# Patient Record
Sex: MALE | Race: WHITE | NOT HISPANIC OR LATINO | Employment: FULL TIME | URBAN - METROPOLITAN AREA
[De-identification: names, ages, dates, MRNs, and addresses within clinical notes are randomized per-mention and may not be internally consistent; named-entity substitution may affect disease eponyms.]

---

## 2024-01-02 PROBLEM — Z85.038 HISTORY OF COLON CANCER: Status: ACTIVE | Noted: 2024-01-02

## 2024-01-02 PROBLEM — R13.14 PHARYNGOESOPHAGEAL DYSPHAGIA: Status: ACTIVE | Noted: 2024-01-02

## 2024-01-02 PROBLEM — C76.0 HEAD AND NECK CANCER (MULTI): Status: ACTIVE | Noted: 2024-01-02

## 2024-01-02 RX ORDER — BUSPIRONE HYDROCHLORIDE 7.5 MG/1
TABLET ORAL
COMMUNITY
Start: 2016-04-15 | End: 2024-01-10 | Stop reason: ALTCHOICE

## 2024-01-02 RX ORDER — FLUTICASONE PROPIONATE 50 MCG
SPRAY, SUSPENSION (ML) NASAL
COMMUNITY
Start: 2017-01-27

## 2024-01-02 RX ORDER — ALBUTEROL SULFATE 90 UG/1
AEROSOL, METERED RESPIRATORY (INHALATION)
COMMUNITY
Start: 2016-04-15

## 2024-01-02 RX ORDER — ESCITALOPRAM OXALATE 20 MG/1
TABLET ORAL
COMMUNITY
Start: 2017-01-27

## 2024-01-07 NOTE — PROGRESS NOTES
Subjective   Patient ID: Jacky Heath is a 63 y.o. male who presents for No chief complaint on file..  HPI  This 63-year-old is being seen back in the office for recheck of his head neck.  He was last seen in February 2017.  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  He was involved with therapy before being seen by ENT.  Primary site was unknown.  He also has a history of colon cancer.  He also has a history of nasal polyps and he had been on antihistamine therapy along with topical nasal steroid sprays.  Review of Systems  A 12 point ROS has been reviewed and are negative for complaint except what is stated in the history of present illness and/or past medical history as noted in the EMR    Objective   Physical Exam  Examination:    CONSTITUTIONAL: Alert, in no acute distress, normal pitch/clarity of voice, well-developed, well-nourished, cooperative.  HEAD/FACE: Normocephalic, atraumatic, no tenderness over the sinuses, facial strength and movement symmetric.    SKIN: Good turgor, no rashes, no suspicious lesions, in the head and neck.    EYES: Both eyes have normal extraocular movements with no nystagmus, pupils are equal and reactive to light and accommodation, conjunctiva is clear.    EARS: Both ears are negative for external skin abnormalities, external auditory canals are without lesions or signs of inflammation, tympanic membranes are intact and are of normal color and texture, no effusions are seen, light reflexes normal, no mastoid tenderness is noted to palpation, objective hearing is intact.    NOSE: No external skin lesions are noted, nares are patent, septum is intact and noninflamed, nasal turbinates are normal in appearance, sinuses are nontender to palpation bilaterally, no internal lesions or polyps are noted, no discharge is noted.    OROPHARYNX/ORAL CAVITY: Mucous membranes of the oropharynx and the oral  cavity proper are without lesions or ulcerations, tongue mobility is normal and no lesions are noted, gingiva and alveolar mucosa is intact without lesions, oral mucosa is moist, muscular movement of the palate and gag reflex are normal.    NASOPHARYNX: Mucous membranes are noninflamed and no secretions or lesions are noted.    LARYNX: No mucosal inflammation or exudates are noted, arytenoids are normal in appearance and mobility, false vocal cords are without lesions as is the remainder of the supraglottic larynx, true vocal folds are mobile without inflammation or obstructions and no masses or lesions are noted in the endolarynx.    NECK: No lymphadenopathy is palpated, neck is supple with full range of motion, thyroid is without swelling or tenderness, trachea is midline, no neck masses are noted.    Lymphatics: No cervical adenopathy or supraclavicular adenopathy noted to palpation.    HEART/VASCULAR: No jugular venous distention is noted, carotid pulsations are intact with a regular rate and rhythm noted,    PULMONARY: Good air movement with normal inspiratory/expiratory effort is noted, no audible wheezing is appreciated.    NEUROLOGIC: Alert and oriented, cranial nerves are grossly intact, gait is normal, sensation in the head and neck is intact,    PSYCH: oriented to person, place and time, normal mood and affect.    EXTREMITIES: No motor dysfunction of the upper and lower extremity is noted.    Patient ID: Jacky Heath is a 63 y.o. male.    Nasal / sinus endoscopy    Date/Time: 1/10/2024 8:00 AM    Performed by: Tim Castro DMD, MD  Authorized by: Tim Castro DMD, MD    Consent:     Consent obtained:  Verbal    Consent given by:  Patient    Risks discussed:  Pain    Alternatives discussed:  No treatment and observation  Procedure details:     Indications: direct visualization of the upper aerodigestive tract and sino-nasal symptoms    Comments:      NASAL ENDOSCOPY    Consent:  Procedure is  discussed including possible risks, benefits or alternative treatments reviewed and verbal consent is obtained.    Indications:  Need to visualize the ostiomeatal complex, posterior nasal cavity and to assess  for points of compression. Need to evaluate for infection, obstruction or nasal pharyngeal abnormalities.    Procedure:  The nasal cavity is topicalized with decongestant spray and 4% lidocaine  for anesthesia. endoscopy is performed with the scope advanced through each nostril in turn to sequentially examine the septum, turbinates, ostiomeatal complex and nasopharynx. laryngeal exam is also done with use of a fiberoptic scope.    Findings: Fiberoptic examination intranasally on the right revealed a very large polyp obstructive to the nasal airway.  There is also smaller polyps in the middle meatal area.  No purulent discharge was noted.  No septal deviation is seen.  There is no irregularity to suspect to suggest papilloma.  On the left there were polyps in the middle meatal area and 1 anterior to the tip of the middle turbinate.  Purulent discharge was noted there was no septal deviation seen.  The nasopharynx is patent with no lesions.  Base of the tongue laryngeal structures were intact with no signs of lesions or postradiation effect.  There is some narrowing at the glottic level due to the Weaker vocal cord movement.  No obstructive are noted.    Post procedure: The patient tolerated the procedure well with no complications experienced.      His audiogram today in both ears revealed evidence for a mild mixed hearing loss up through 3000 Hz then a moderate to moderately severe high-frequency loss is noted.  There is a conductive component mostly in the low tone areas bilaterally.  SRT levels are 30 dB bilaterally discrimination scores 100% on the right 96% on the left at 60 dB.  Tympanograms revealed a normal pattern on the right mild type C on the left  Assessment/Plan   Problem List Items Addressed This  Visit             ICD-10-CM    Head and neck cancer (CMS/[facility]) C76.0    Pharyngoesophageal dysphagia R13.14     Other Visit Diagnoses         Codes    Nasal polyposis    -  Primary J33.9    Relevant Medications    predniSONE (Deltasone) 10 mg tablet (Start on 1/11/2024)    Other Relevant Orders    CT sinuss wo IV contrast volumetric surgical planning    Sensorineural hearing loss (SNHL) of both ears     H90.3    Tinnitus of both ears     H93.13          I discussed the clinical findings with the patient.  He does have obstructive's obstructive polyps in both nasal cavity especially on the right.  He is can be placed on a tapered course of prednisone for shrinkage of the polyps.  A CT scan of the sinuses was obtained to further evaluate their extent.  He will have a choice regarding endoscopic sinus surgery and an appropriate referral to Dr. Jenkins for that can be made.  Nasal saline rinses should be attempted although it may be difficult for him to circulate the saline due to the obstructive polyps.  We talked about the use of Singulair but because of his background of mood disorders it was felt not to be advisable.  Following the CT scan he will be seen in the office for an opinion in regards to surgical care.    The standpoint of his hearing he does have evidence for a mixed hearing loss in both ears.  Discrimination scores are still intact.  Treatment options would include amplification of the hearing which was discussed.  He should be seen yearly for an audiogram sooner if he has any sudden change in hearing.  I made him aware the fact that there was a conductive component that could be influenced by air pressure changes which could also be influenced by his nasal congestion.  Ear pain, drainage, sudden hearing change should be reported to the office.       Tim Castro DMD, MD 01/07/24 3:18 PM

## 2024-01-10 ENCOUNTER — OFFICE VISIT (OUTPATIENT)
Dept: OTOLARYNGOLOGY | Facility: CLINIC | Age: 64
End: 2024-01-10
Payer: MEDICARE

## 2024-01-10 ENCOUNTER — CLINICAL SUPPORT (OUTPATIENT)
Dept: AUDIOLOGY | Facility: CLINIC | Age: 64
End: 2024-01-10
Payer: MEDICARE

## 2024-01-10 VITALS
SYSTOLIC BLOOD PRESSURE: 100 MMHG | WEIGHT: 217 LBS | BODY MASS INDEX: 30.38 KG/M2 | DIASTOLIC BLOOD PRESSURE: 67 MMHG | HEIGHT: 71 IN | HEART RATE: 59 BPM

## 2024-01-10 DIAGNOSIS — H90.6 MIXED CONDUCTIVE AND SENSORINEURAL HEARING LOSS OF BOTH EARS: Primary | ICD-10-CM

## 2024-01-10 DIAGNOSIS — H90.6 MIXED HEARING LOSS, BILATERAL: ICD-10-CM

## 2024-01-10 DIAGNOSIS — J33.9 NASAL POLYPOSIS: Primary | ICD-10-CM

## 2024-01-10 DIAGNOSIS — H93.11 TINNITUS, RIGHT: ICD-10-CM

## 2024-01-10 DIAGNOSIS — H93.13 TINNITUS OF BOTH EARS: ICD-10-CM

## 2024-01-10 DIAGNOSIS — C76.0 HEAD AND NECK CANCER (MULTI): ICD-10-CM

## 2024-01-10 DIAGNOSIS — R13.14 PHARYNGOESOPHAGEAL DYSPHAGIA: ICD-10-CM

## 2024-01-10 DIAGNOSIS — H69.92 EUSTACHIAN TUBE DYSFUNCTION, LEFT: ICD-10-CM

## 2024-01-10 PROCEDURE — 1160F RVW MEDS BY RX/DR IN RCRD: CPT | Performed by: OTOLARYNGOLOGY

## 2024-01-10 PROCEDURE — 31231 NASAL ENDOSCOPY DX: CPT | Performed by: OTOLARYNGOLOGY

## 2024-01-10 PROCEDURE — 1036F TOBACCO NON-USER: CPT | Performed by: OTOLARYNGOLOGY

## 2024-01-10 PROCEDURE — 92557 COMPREHENSIVE HEARING TEST: CPT | Performed by: AUDIOLOGIST

## 2024-01-10 PROCEDURE — 1159F MED LIST DOCD IN RCRD: CPT | Performed by: OTOLARYNGOLOGY

## 2024-01-10 PROCEDURE — 99204 OFFICE O/P NEW MOD 45 MIN: CPT | Performed by: OTOLARYNGOLOGY

## 2024-01-10 PROCEDURE — 92567 TYMPANOMETRY: CPT | Performed by: AUDIOLOGIST

## 2024-01-10 RX ORDER — BISMUTH SUBSALICYLATE 262 MG
1 TABLET,CHEWABLE ORAL DAILY
COMMUNITY

## 2024-01-10 RX ORDER — PREDNISONE 10 MG/1
TABLET ORAL
Qty: 27 TABLET | Refills: 0 | Status: SHIPPED | OUTPATIENT
Start: 2024-01-11 | End: 2024-01-21

## 2024-01-10 RX ORDER — CETIRIZINE HYDROCHLORIDE 10 MG/1
10 TABLET ORAL DAILY
COMMUNITY

## 2024-01-10 RX ORDER — TAMSULOSIN HYDROCHLORIDE 0.4 MG/1
0.4 CAPSULE ORAL DAILY
COMMUNITY
Start: 2024-01-04

## 2024-01-10 RX ORDER — BUSPIRONE HYDROCHLORIDE 15 MG/1
15 TABLET ORAL DAILY
COMMUNITY
Start: 2024-01-04

## 2024-01-10 NOTE — PROGRESS NOTES
COMPREHENSIVE AUDIOMETRIC EVALUATION      Name:  Jacky Heath  :  1960  Age:  63 y.o.  Date of Evaluation:  01/10/24   Referring Provider:  Dr. Castro     History:  Mr. Heath was seen today for an evaluation of hearing.  Patient reported right tinnitus and concerns for hearing sensitivity.  When asked, patient denied otalgia, aural fullness, dizziness, medical history significant for diabetes, and treatment by chemotherapy or radiation.    See audiometric evaluation at end of this report or scanned under media tab    OTOSCOPY:       Right Ear: Clear canal       Left Ear: Clear canal    226 Hz TYMPANOMETRY:       Right Ear: Type A: normal peak pressure, compliance, and ear canal volume, consistent with middle ear function within normal limits       Left Ear: Type C: Negative pressure with normal compliance and ear canal volume, consistent with eustachian tube dysfunction    NOTE: Extremely difficulty time obtaining seal    AUDIOMETRIC EVALUATION (Inserts, checked with phones):       Right Ear: Mild rising to WNL at 1000 Hz sloping to Moderately severe Mixed hearing loss                 Left Ear: Mild sloping to Severe Mixed hearing loss           Test technique:  Standard Audiometry  Reliability:   good    SPEECH RECOGNITION THRESHOLD:       Right Ear:  30 dBHL in fair agreement with PTA       Left Ear:  30 dBHL in good agreement with PTA    WORD RECOGNITION:       Right Ear:  excellent (100%) at elevated presentation level       Left Ear:  excellent (96%) at elevated presentation level    IPSILATERAL ACOUSTIC REFLEXES: Attempted but could not maintain consistent seal    DISCUSSION:   Discussed results and recommendations with patient.  Questions were addressed and the patient was encouraged to contact our department should concerns arise.    RECOMMENDATIONS:  -Recommend patient return following any medical management for repeated audiometric evaluation for evaluation of efficacy of management on hearing  sensitivity and tympanometry  -Recommend patient return should concerns for changes in hearing arise or as medically indicated.    Yahir Ash, CCC-A     Appt: 8:30 - 9:00 AM

## 2024-01-23 ENCOUNTER — HOSPITAL ENCOUNTER (OUTPATIENT)
Dept: RADIOLOGY | Facility: CLINIC | Age: 64
Discharge: HOME | End: 2024-01-23
Payer: MEDICARE

## 2024-01-23 ENCOUNTER — TELEPHONE (OUTPATIENT)
Dept: OTOLARYNGOLOGY | Facility: CLINIC | Age: 64
End: 2024-01-23
Payer: MEDICARE

## 2024-01-23 DIAGNOSIS — J33.9 NASAL POLYPOSIS: ICD-10-CM

## 2024-01-23 PROCEDURE — 70486 CT MAXILLOFACIAL W/O DYE: CPT

## 2024-01-23 NOTE — TELEPHONE ENCOUNTER
----- Message from Tim Castro DMD, MD sent at 1/23/2024 12:48 PM EST -----  Please call--the CT scan of the sinuses revealed significant obstruction within the sinus complexes involving the cheek areas and small sinuses called ethmoid sinuses.  This could relate to the nasal polyps alone or also be related to cystic changes within the sinus cavities or other abnormalities related to mucous membranes.  This area needs to be evaluated further as discussed and you are scheduled in early February for a follow-up here.  In the interim you should try to use saline as a nasal wash along with a topical steroid eel spray.  The CT scan images are available for review with Dr. Jenkins when you see him.

## 2024-01-23 NOTE — RESULT ENCOUNTER NOTE
Please call--the CT scan of the sinuses revealed significant obstruction within the sinus complexes involving the cheek areas and small sinuses called ethmoid sinuses.  This could relate to the nasal polyps alone or also be related to cystic changes within the sinus cavities or other abnormalities related to mucous membranes.  This area needs to be evaluated further as discussed and you are scheduled in early February for a follow-up here.  In the interim you should try to use saline as a nasal wash along with a topical steroid eel spray.  The CT scan images are available for review with Dr. Jenkins when you see him.

## 2024-01-30 ENCOUNTER — APPOINTMENT (OUTPATIENT)
Dept: OTOLARYNGOLOGY | Facility: CLINIC | Age: 64
End: 2024-01-30
Payer: MEDICARE

## 2024-02-06 ENCOUNTER — OFFICE VISIT (OUTPATIENT)
Dept: OTOLARYNGOLOGY | Facility: CLINIC | Age: 64
End: 2024-02-06
Payer: MEDICARE

## 2024-02-06 VITALS — WEIGHT: 215 LBS | HEIGHT: 71 IN | BODY MASS INDEX: 30.1 KG/M2

## 2024-02-06 DIAGNOSIS — J34.89 NASAL DRAINAGE: ICD-10-CM

## 2024-02-06 DIAGNOSIS — J34.89 NASAL OBSTRUCTION: ICD-10-CM

## 2024-02-06 DIAGNOSIS — R09.81 NASAL CONGESTION: ICD-10-CM

## 2024-02-06 DIAGNOSIS — J33.9 NASAL POLYPS: ICD-10-CM

## 2024-02-06 DIAGNOSIS — J34.3 HYPERTROPHY OF BOTH INFERIOR NASAL TURBINATES: ICD-10-CM

## 2024-02-06 DIAGNOSIS — R44.8 FACIAL PRESSURE: ICD-10-CM

## 2024-02-06 DIAGNOSIS — J32.9 CHRONIC RHINOSINUSITIS: Primary | ICD-10-CM

## 2024-02-06 PROCEDURE — 31237 NSL/SINS NDSC SURG BX POLYPC: CPT | Performed by: STUDENT IN AN ORGANIZED HEALTH CARE EDUCATION/TRAINING PROGRAM

## 2024-02-06 PROCEDURE — 88304 TISSUE EXAM BY PATHOLOGIST: CPT

## 2024-02-06 PROCEDURE — 88304 TISSUE EXAM BY PATHOLOGIST: CPT | Performed by: PATHOLOGY

## 2024-02-06 PROCEDURE — 1036F TOBACCO NON-USER: CPT | Performed by: STUDENT IN AN ORGANIZED HEALTH CARE EDUCATION/TRAINING PROGRAM

## 2024-02-06 PROCEDURE — 99215 OFFICE O/P EST HI 40 MIN: CPT | Performed by: STUDENT IN AN ORGANIZED HEALTH CARE EDUCATION/TRAINING PROGRAM

## 2024-02-06 PROCEDURE — 1159F MED LIST DOCD IN RCRD: CPT | Performed by: STUDENT IN AN ORGANIZED HEALTH CARE EDUCATION/TRAINING PROGRAM

## 2024-02-06 PROCEDURE — 1160F RVW MEDS BY RX/DR IN RCRD: CPT | Performed by: STUDENT IN AN ORGANIZED HEALTH CARE EDUCATION/TRAINING PROGRAM

## 2024-02-06 RX ORDER — AMOXICILLIN AND CLAVULANATE POTASSIUM 875; 125 MG/1; MG/1
875 TABLET, FILM COATED ORAL 2 TIMES DAILY
Qty: 20 TABLET | Refills: 0 | Status: SHIPPED | OUTPATIENT
Start: 2024-02-06 | End: 2024-02-16

## 2024-02-06 RX ORDER — SOD CHLOR,BICARB/SQUEEZ BOTTLE
PACKET, WITH RINSE DEVICE NASAL
Qty: 1 EACH | Refills: 3 | Status: SHIPPED | OUTPATIENT
Start: 2024-02-06

## 2024-02-06 RX ORDER — AMOXICILLIN AND CLAVULANATE POTASSIUM 875; 125 MG/1; MG/1
875 TABLET, FILM COATED ORAL 2 TIMES DAILY
Qty: 28 TABLET | Refills: 0 | Status: SHIPPED | OUTPATIENT
Start: 2024-02-06 | End: 2024-02-06 | Stop reason: SDUPTHER

## 2024-02-06 RX ORDER — FERROUS SULFATE 325(65) MG
65 TABLET, DELAYED RELEASE (ENTERIC COATED) ORAL
COMMUNITY

## 2024-02-06 RX ORDER — OMEGA-3S/DHA/EPA/FISH OIL/D3 300MG-1000
CAPSULE ORAL
COMMUNITY

## 2024-02-06 RX ORDER — LANOLIN ALCOHOL/MO/W.PET/CERES
1000 CREAM (GRAM) TOPICAL DAILY
COMMUNITY

## 2024-02-06 RX ORDER — ZINC GLUCONATE 50 MG
50 TABLET ORAL DAILY
COMMUNITY

## 2024-02-06 NOTE — PROGRESS NOTES
HPI  63-year-old male presenting referred by Dr. Castro for evaluation of multiple sinonasal issues.  Main Symptoms: Daily sinonasal symptoms include bilateral nasal congestion/obstruction, anterior and posterior nasal drainage, fluctuant facial pressure along the maxillary and frontal regions bilaterally and absent smell.  Duration: Years  Laterality: Bilateral  Received systemic steroids without significant improvement of his symptoms  Has a history of H&N HPV cancer (unknown primary site) treated with chemoradiation therapy  >9-10y ago ([facility]).  Also has a history of colon cancer.       Patient denies facial pain, facial numbness, immunotherapy.    No odynophagia/dysphagia/SOB/dyspnea/hoarseness/fevers/chills/weight loss/night sweats.    Current treatment:  Nasal Steroid: Flonase   Sinus Rinse: No  Antihistamine: No  Prednisone: No  Other: None    Recent CT: CT sinus 1/10/2024 reviewed notable for polypoid changes within the nasal corridors and middle meatus bilaterally more noticeable on the right with opacification of bilateral maxillary sinuses, right frontal sinus, right ethmoid sinus with bone thinning/remodeling on the right fovea ethmoidalis, mild mucosal edema noted along the left ethmoid region, mild mucosal edema noted along the right sphenoid sinus.    Previous nasal/sinus surgery: Remote history of septoplasty.    Past Medical History:   Diagnosis Date    Acute respiratory distress     Respiratory difficulty    Personal history of other endocrine, nutritional and metabolic disease     H/O diabetes mellitus        Past Surgical History:   Procedure Laterality Date    GASTRIC BYPASS  04/15/2016    Gastric Surgery For Morbid Obesity Bypass With Eamon-en-Y        Current Outpatient Medications   Medication Instructions    albuterol (ProAir HFA) 90 mcg/actuation inhaler inhalation    busPIRone (BUSPAR) 15 mg, oral, Daily    cetirizine (ZYRTEC) 10 mg, oral, Daily    cyanocobalamin (VITAMIN B-12) 1,000 mcg,  oral, Daily    escitalopram (Lexapro) 20 mg tablet oral    ferrous sulfate 65 mg, oral, 3 times daily with meals, Do not crush, chew, or split.    fluticasone (Flonase) 50 mcg/actuation nasal spray nasal    multivitamin tablet 1 tablet, oral, Daily    omega-3s-dha-epa-fish oil (Fish OiL) 120 mg-180 mg- 60 mg-1,200 mg capsule oral    tamsulosin (FLOMAX) 0.4 mg, oral, Daily    zinc gluconate 50 mg tablet 50 mg of elemental zinc, oral, Daily        Allergies   Allergen Reactions    Lisinopril Other        Review of Systems  A detailed 12 point ROS was performed and is negative except as noted in the intake form, HPI and/or Past Medical History    Physical Exam   CONSTITUTIONAL: Well developed, well-nourished  VOICE: Normal voice quality  RESPIRATION: Breathing comfortably, no stridor.  CV: No clubbing/cyanosis/edema in hands.  EYES: EOM Intact, sclera normal.  NEURO: Alert and oriented times 3, Cranial nerves V,VII intact and symmetric bilaterally.  HEAD AND FACE: Symmetric facial features, no masses or lesions, sinuses nontender to palpation.  SALIVARY GLANDS: Parotid and submandibular glands normal bilaterally.  EARS: Normal external ears, external auditory canals, and TMs to otoscopy  + NOSE: External nose midline, anterior rhinoscopy is normal with limited visualization to the anterior aspect of the interior turbinates. No lesions noted.  Bilateral inferior turbinate hypertrophy  Visualized septum mostly ML  ORAL CAVITY/OROPHARYNX/LIPS: Normal mucous membranes, normal floor of mouth/tongue/OP, no masses or lesions are noted.  PHARYNGEAL WALLS AND NASOPHARYNX: No masses noted. Mucosa appears clean and moist  NECK/LYMPH: No LAD, no thyroid masses. Trachea palpably midline  SKIN: Neck skin is without injury s/p RT  PSYCH: Alert and oriented with appropriate mood and affect     Nasal endoscopy with biopsy:  PROCEDURE NOTE    For better visualization because of septal deviation, turbinate hypertrophy nasal endoscopy was  performed after verbal consent was obtained by the patient and/or guardian. Both nostrils were sprayed with a mixture of lidocaine 4% and Afrin. After a sufficient amount of time elapsed for mucosal anesthesia to take place, the nasal endoscope was advanced into the nostril.    The following areas were visualized:  Nasal passage, nasal septum, turbinates, middle meatus, nasopharynx, sinus ostia    The patient tolerated the procedure well and these structures were found to be normal except as follows:  Bilateral inferior turbinate hypertrophy  Visualized septum ML  Right: Generalized mucosal edema, polypoid tissue obstructing the entire nasal corridor, slight irregularity noted (biopsy obtained).  Left: Generalized mucosal edema, polyps obstructing the middle meatus and the nasal corridor.  Mucopurulent drainage noted along the left middle meatus  Biopsy of right nasal corridor   The procedure was reviewed and explained, consent was obtained.  The area was anesthetized with lidocaine.  A cup forceps was used to grasp polypoid tissue obstructing the right nasal corridor.  Specimen was sent in formalin.  Hemostasis was achieved.  Surgifoam packing placed.  The patient tolerated the procedure well.  No complications were encountered.        Assessment  Chronic rhinosinusitis with nasal polyps  Facial pressure  Nasal airway obstruction  Nasal septal deviation  Nasal drainage  Bilateral inferior turbinate hypertrophy  History of septoplasty  History of head and neck cancer, unknown primary s/p chemoradiation therapy 2014.       Plan  The patient and I discussed their current nasal / sinus symptoms as well as several therapeutic options.  Nasal endoscopy notable for polypoid tissue obstructing the entire right nasal corridor, biopsy obtained.  Polyps obstructing the left middle meatus and partially obstructing the nasal corridor.  Mucopurulent drainage noted in the left middle meatus.  Augmentin course prescribed.  The  patient recently completed a systemic steroid course, we will defer additional steroid at this time.  Continue Flonase and nasal saline.  The patient reports no significant improvement of his symptoms following adequate medical therapy.  Multiple treatment alternatives, risks and benefits discussed.  He would like to proceed with surgery including bilateral functional endoscopic sinus surgery, bilateral inferior turbinate reduction/outfracture possible revision septoplasty.    The patient and I discussed the aspects of the proposed surgery today. We discussed alternative therapies, which include doing nothing and continuing medical therapy. We discussed the fact that surgery offers an opportunity to correct any anatomic deformities and to relieve a problem that has not been improved with medical therapy. The anticipated benefits from endoscopic surgery include but are not limited to: eradication of sinus disease, relief from pain and symptoms. The risks and complications associated with this surgery were discussed. These include, but were not limited to: anesthesia/medication complications (cva, mi, death), heart/lung/brain dysfunction, synechiae formation, poor cosmetic result possibly requiring reconstructive surgery, septal perforation possibly requiring reconstructive surgery, bleeding, infection, postoperative pain, failure to relieve pain and other preoperative symptoms, visual acuity changes (temporary or permanent), loss of smell/taste, csf leak, need for further surgeries in future. Each of these potential complications was discussed in detail.  No guarantees were implied and the possibility of recurrence was discussed and understood. The nature of the procedure and the pre and postoperative processes were discussed as well.     The patient understands, asked appropriate questions, and wishes to proceed with surgery.

## 2024-02-06 NOTE — PATIENT INSTRUCTIONS
What can I expect before and after surgery?     1) Before surgery   In preparation for your surgery, your physician may prescribe a preoperative regimen of medications in order to optimize the condition of your sinuses prior to surgery. The medications may include oral steroids (prednisone) that you start taking 7 days before surgery (don't take it on the day of surgery on) restart on postoperative day 1. Take the antibiotics AFTER the surgery. If any preoperative medications are deemed necessary by your physician, please be sure to start the medications on the day directed and adhere closely to the prescription. In addition, you should avoid taking aspirin and aspirin related products for at least 14 days prior to surgery. Aspirin, ibuprofen (Motrin/Advil), naproxen (Aleve), Excedrin (contains aspirin) and related products can thin the blood and create excessive bleeding both during the surgery and in the postoperative period. Tylenol is acceptable and may be taken any time up to the day of surgery.     2) After surgery   At your first postoperative visit any packs that may have been placed will be removed and your nose will be cleaned of clotted blood and debris. This is an uncomfortable procedure, so we recommend that you take a pain pill approximately one hour prior to your appointment, even if you have not been experiencing much postoperative pain up to that day. You will continue to have additional postoperative visits at an interval determined by your surgeon, until your sinuses have completely healed and are doing well. Sinus surgery does NOT eliminate the underlying causes that gave you sinusitis in the first place, so it is important that you continue your medical therapies (nasal spray, irrigation etc) even after surgery.     How do I prepare for surgery?   If your age and/or medical history indicate it, your physician may order testing prior to surgery such as blood tests, EKG, or chest x-ray. If these  have been ordered, please make sure they are completed before the day of surgery.   If you have a significant increase in your sinus infection in the week(s) prior to surgery, notify us. Your surgery may need to be postponed.   Make sure you have a  to take you home after surgery. You will NOT be allowed to leave the hospital by yourself or to drive yourself home.     DO NOT take aspirin or salicylate containing pain medication for at least ten days prior to surgery. Aspirin, even in small quantities, can significantly increase bleeding during surgery and postoperatively.     DO NOT take non-steroidal anti-inflammatory drugs (Ibuprofen, Advil, Motrin, Aleve, Naproxen) for at least five days prior to surgery. These drugs will also increase bleeding, although the effects on the blood are shorter. You may use Tylenol for pain control during this time if needed. Any other medications which thin the blood (such as Coumadin or Plavix) will need to be stopped prior to surgery. Your surgeon will discuss the timing of when to stop coumadin and whether another short acting medication such as Lovenox will be needed to use in place of the Coumadin while you are off it.     DO NOT smoke if at all possible for at least three weeks prior to surgery. Not only does smoking worsen sinus symptoms, smoking in the weeks before or after surgery will result in excessive scarring, and may result in failure of the operation.     DO NOT eat or drink anything beginning at midnight the night before surgery unless otherwise instructed by our colleagues in anesthesia. In most cases your normal morning medications should be taken with a small sip of water on the morning of surgery - your surgeon will let you know if there any exceptions to this.     NOTIFY YOUR SURGEON if you have a prosthesis or heart valve disorder that requires antibiotics at the time of surgery or if you have had prior difficulty with general anesthesia.     What will  happen during surgery?   The surgery is typically not uncomfortable and should not be an unpleasant experience. Depending on the several factors, the operation may be performed under general anesthesia or under local anesthesia with an anesthesiologist providing monitored sedation.     Regardless of anesthetic method chosen, in the preoperative area an intravenous line will be started to administer fluid and you will be given medicines in your IV to help you relax.   Once in the operating room, if local anesthesia is chosen, you will be given additional IV medication to keep you sleepy and relaxed. Medicated pieces of cotton will be placed in your nose and several injections of local anesthetic will be administered inside your nose. The anesthesiologist will make you extra sleepy during this process, but once the numbing is done, you will be allowed to awaken a little more. You will be able to talk to us during the surgery, so let us know if anything bothers you. You will usually hear some crunching sounds as bone is removed which may sound loud to you. For this reason many patients elect to bring an ipod or other device to the OR with them and listen to music during the procedure. You may also feel some mucus or bleeding in the back of your throat that you should swallow. Should you experience significant discomfort during the procedure, we will provide additional monitored sedation.     If you are receiving general anesthesia, a temporary tube is inserted into you mouth/throat after you are completely asleep to maintain your breathing. If the planned surgery is for more than 3 hours duration, a urinary catheter may also be placed while you are asleep. It is usually removed before you leave the recovery room. It is rare that a catheter is necessary. When the surgery is completed, the breathing tube is removed from your airway before you regain consciousness. Patients occasionally report sore throat and/or nausea  postoperatively as a result of the breathing tube and anesthetic.     What can I expect following my surgery?   Some bloody postnasal discharge may occur for approximately two weeks after this procedure. This is normal and slowly improves. You SHOULD NOT blow your nose for at least seven days following surgery. Since nasal packing is usually not used, you will have some bleeding from your nose and you may go through more than one box of tissues by spotting them with blood during the first 24-48 hours after surgery. As the sinuses begin to clear themselves, you can expect to have some thick brown drainage from your nose. This is mucus and old blood and does not indicate an infection.    You may experience some discomfort postoperatively due to manipulation and inflammation. Take your pain medication as directed. Often extra-strength Tylenol® is sufficient.    On the the first day after surgery you should begin irrigating six times a day with saline using the [facility] Sinus Rinse bottle. If you have had packing placed in your nose, you will not be able to do these things until the packing is removed. Your surgeon will inform you if packing has been placed.   The first follow-up visit is usually arranged at approximately one week after surgery to clean clot and crusts from the nose. This visit is usually uncomfortable and we recommend that you take a dose of your pain medication about one hour prior to the visit. If you are using narcotic pain medicine, you will need a . Consent to the surgery also includes consent to this postoperative care.     Careful postoperative care by both you and your surgeon is essential to the success of the surgery. It is very important that you follow these instructions, as well as any additional instructions given by us, to promote healing and decrease the chance of complications from the surgery.     SOME IMPORTANT POSTOPERATIVE DO´S AND DON´TS AFTER SURGERY     ° DO NOT blow your  nose until you have been given permission to do so (usually one week following surgery).     ° DO NOT bend, lift or strain for at lease one week after surgery. These activities will promote bleeding from your nose. You should not plan on participating in any rigorous activity until healing is completed.     ° DO NOT suppress the need to cough or sneeze, but cough/sneeze with your mouth open.     ° DO NOT resume use of any aspirin-containing products or other blood thinners until after discussing this with us. Typically, you can restart after 7-10 days.     ° DO NOT overuse Tylenol (acetaminophen). Extra-strength Tylenol can be used for pain but can be harmful to your liver if used overdosed. Your prescription narcotic pain medicine also contains Tylenol, so it is important that you don´t use both at the same time. Do not exceed 2000 mg of Tylenol in 24 hours.     ° DO use nasal saline spray (without decongestant) every hour while you are awake beginning the day after surgery. This helps moisten your nose and prevents large crusts from forming.    ° DO use nasal saline irrigation at least six times daily beginning on day after surgery     ° DO take your antibiotics (if they have been prescribed for you). Mild diarrhea from antibiotic usage is common. This can often be prevented by taking acidophilus daily, which is found in yogurt with active cultures or as tablets in a health food store. If you should experience severe persistent diarrhea, this may be indicative of another health problem. In this case stop the antibiotic and notify us. Further evaluation may be required.     ° DO notify us for any of the following: temperature elevations above 100.5 F, clear watery drainage from your nose, changes in vision, swelling of the eyes, worsening headache or neck stiffness.     ° DO use a Qtip to place antibiotic ointment in the very front inside your nostrils as needed to minimize or soften crusting. Neosporin, bacitracin  or double/triple antibiotic ointments are all OK to use, up to twice a day     ° DO resume your normal preoperative medications (except aspirin or other blood thinners as noted above)    For the first week following surgery you should not blow your nose or perform any activities requiring exertion such as bending, straining, exercising or lifting anything heavier than 10 pounds. You should not plan any air travel in the first week after surgery.   Ultimately, it will take about 2-3 months for you to fully recover from surgery

## 2024-02-12 LAB
LABORATORY COMMENT REPORT: NORMAL
PATH REPORT.FINAL DX SPEC: NORMAL
PATH REPORT.GROSS SPEC: NORMAL
PATH REPORT.RELEVANT HX SPEC: NORMAL
PATH REPORT.TOTAL CANCER: NORMAL

## 2024-04-12 ENCOUNTER — OFFICE VISIT (OUTPATIENT)
Dept: OTOLARYNGOLOGY | Facility: CLINIC | Age: 64
End: 2024-04-12
Payer: MEDICARE

## 2024-04-12 VITALS — BODY MASS INDEX: 30.8 KG/M2 | WEIGHT: 220 LBS | HEIGHT: 71 IN

## 2024-04-12 DIAGNOSIS — J34.89 NASAL DRAINAGE: ICD-10-CM

## 2024-04-12 DIAGNOSIS — R44.8 FACIAL PRESSURE: ICD-10-CM

## 2024-04-12 DIAGNOSIS — R09.81 NASAL CONGESTION: ICD-10-CM

## 2024-04-12 DIAGNOSIS — J33.9 NASAL POLYPS: ICD-10-CM

## 2024-04-12 DIAGNOSIS — J34.3 HYPERTROPHY OF BOTH INFERIOR NASAL TURBINATES: ICD-10-CM

## 2024-04-12 DIAGNOSIS — J34.89 NASAL OBSTRUCTION: ICD-10-CM

## 2024-04-12 DIAGNOSIS — J32.9 CHRONIC RHINOSINUSITIS: Primary | ICD-10-CM

## 2024-04-12 DIAGNOSIS — J33.9 NASAL POLYPOSIS: ICD-10-CM

## 2024-04-12 PROCEDURE — 1159F MED LIST DOCD IN RCRD: CPT | Performed by: STUDENT IN AN ORGANIZED HEALTH CARE EDUCATION/TRAINING PROGRAM

## 2024-04-12 PROCEDURE — 1160F RVW MEDS BY RX/DR IN RCRD: CPT | Performed by: STUDENT IN AN ORGANIZED HEALTH CARE EDUCATION/TRAINING PROGRAM

## 2024-04-12 PROCEDURE — 1036F TOBACCO NON-USER: CPT | Performed by: STUDENT IN AN ORGANIZED HEALTH CARE EDUCATION/TRAINING PROGRAM

## 2024-04-12 PROCEDURE — 99214 OFFICE O/P EST MOD 30 MIN: CPT | Performed by: STUDENT IN AN ORGANIZED HEALTH CARE EDUCATION/TRAINING PROGRAM

## 2024-04-12 RX ORDER — PREDNISONE 10 MG/1
TABLET ORAL
Qty: 31 TABLET | Refills: 0 | Status: SHIPPED | OUTPATIENT
Start: 2024-04-12

## 2024-04-12 NOTE — PROGRESS NOTES
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  He is here to discuss his upcoming surgery and clarify questions.  Recall   63-year-old male presenting referred by Dr. Castro for evaluation of multiple sinonasal issues.  Main Symptoms: Daily sinonasal symptoms include bilateral nasal congestion/obstruction, anterior and posterior nasal drainage, fluctuant facial pressure along the maxillary and frontal regions bilaterally and absent smell.  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  Also has a history of colon cancer.       Patient denies facial pain, facial numbness, immunotherapy.    No odynophagia/dysphagia/SOB/dyspnea/hoarseness/fevers/chills/weight loss/night sweats.    Current treatment:  Nasal Steroid: Flonase   Sinus Rinse: No  Antihistamine: No  Prednisone: No  Other: None    Recent CT: CT sinus 1/10/2024 reviewed notable for polypoid changes within the nasal corridors and middle meatus bilaterally more noticeable on the right with opacification of bilateral maxillary sinuses, right frontal sinus, right ethmoid sinus with bone thinning/remodeling on the right fovea ethmoidalis, mild mucosal edema noted along the left ethmoid region, mild mucosal edema noted along the right sphenoid sinus.    Previous nasal/sinus surgery: Remote history of septoplasty.    Past Medical History:   Diagnosis Date    Acute respiratory distress     Respiratory difficulty    Personal history of other endocrine, nutritional and metabolic disease     H/O diabetes mellitus        Past Surgical History:   Procedure Laterality Date    GASTRIC BYPASS  04/15/2016    Gastric Surgery For Morbid Obesity Bypass With [id number]-Y        Current Outpatient Medications   Medication  Instructions    albuterol (ProAir HFA) 90 mcg/actuation inhaler inhalation    busPIRone (BUSPAR) 15 mg, oral, Daily    cetirizine (ZYRTEC) 10 mg, oral, Daily    cyanocobalamin (VITAMIN B-12) 1,000 mcg, oral, Daily    escitalopram (Lexapro) 20 mg tablet oral    ferrous sulfate 65 mg, oral, 3 times daily with meals, Do not crush, chew, or split.    fluticasone (Flonase) 50 mcg/actuation nasal spray nasal    multivitamin tablet 1 tablet, oral, Daily    omega-3s-dha-epa-fish oil (Fish OiL) 120 mg-180 mg- 60 mg-1,200 mg capsule oral    sod chloride-sodium bicarb ([employer] Sinus Rinse Complete) nasal rinse Irrigate your nose per instructions twice daily    tamsulosin (FLOMAX) 0.4 mg, oral, Daily    zinc gluconate 50 mg tablet 50 mg of elemental zinc, oral, Daily        Allergies   Allergen Reactions    Lisinopril Other        Review of Systems  A detailed 12 point ROS was performed and is negative except as noted in the intake form, HPI and/or Past Medical History    Physical Exam   CONSTITUTIONAL: Well developed, well-nourished  VOICE: Normal voice quality  RESPIRATION: Breathing comfortably, no stridor.  CV: No clubbing/cyanosis/edema in hands.  EYES: EOM Intact, sclera normal.  NEURO: Alert and oriented times 3, Cranial nerves V,VII intact and symmetric bilaterally.  HEAD AND FACE: Symmetric facial features, no masses or lesions, sinuses nontender to palpation.  SALIVARY GLANDS: Parotid and submandibular glands normal bilaterally.  EARS: Normal external ears, external auditory canals, and TMs to otoscopy  + NOSE: External nose midline, anterior rhinoscopy is normal with limited visualization to the anterior aspect of the interior turbinates. No lesions noted.  Bilateral inferior turbinate hypertrophy  Visualized septum mostly ML  ORAL CAVITY/OROPHARYNX/LIPS: Normal mucous membranes, normal floor of mouth/tongue/OP, no masses or lesions are noted.  PHARYNGEAL WALLS AND NASOPHARYNX: No masses noted. Mucosa appears clean  and moist  NECK/LYMPH: No LAD, no thyroid masses. Trachea palpably midline  SKIN: Neck skin is without injury s/p RT  PSYCH: Alert and oriented with appropriate mood and affect           Assessment  Chronic rhinosinusitis with nasal polyps  Facial pressure  Nasal airway obstruction  Nasal septal deviation  Nasal drainage  Bilateral inferior turbinate hypertrophy  History of septoplasty  History of head and neck cancer, unknown primary s/p chemoradiation therapy 2014.       Plan  The patient and I discussed their current nasal / sinus symptoms as well as several therapeutic options.  The patient reports no significant improvement of his symptoms following adequate medical therapy.  Multiple treatment alternatives, risks and benefits discussed.  He will like to proceed with surgery including bilateral functional endoscopic sinus surgery, bilateral inferior turbinate reduction/outfracture, possible revision septoplasty.    The patient and I discussed the aspects of the proposed surgery today. We discussed alternative therapies, which include doing nothing and continuing medical therapy. We discussed the fact that surgery offers an opportunity to correct any anatomic deformities and to relieve a problem that has not been improved with medical therapy. The anticipated benefits from endoscopic surgery include but are not limited to: eradication of sinus disease, relief from pain and symptoms. The risks and complications associated with this surgery were discussed. These include, but were not limited to: anesthesia/medication complications (cva, mi, death), heart/lung/brain dysfunction, synechiae formation, poor cosmetic result possibly requiring reconstructive surgery, septal perforation possibly requiring reconstructive surgery, bleeding, infection, postoperative pain, failure to relieve pain and other preoperative symptoms, visual acuity changes (temporary or permanent), loss of smell/taste, csf leak, need for further  surgeries in future. Each of these potential complications was discussed in detail.  No guarantees were implied and the possibility of recurrence was discussed and understood. The nature of the procedure and the pre and postoperative processes were discussed as well.     The patient understands, asked appropriate questions, and wishes to proceed with surgery.     Preoperative steroid taper prescribed (risks/potential adverse effects discussed).

## 2024-04-12 NOTE — PATIENT INSTRUCTIONS
What can I expect before and after surgery?     1) Before surgery   In preparation for your surgery, your physician may prescribe a preoperative regimen of medications in order to optimize the condition of your sinuses prior to surgery. The medications may include oral steroids (prednisone) that you start taking 7 days before surgery (don't take it on the day of surgery on) restart on postoperative day 1. Take the antibiotics AFTER the surgery. If any preoperative medications are deemed necessary by your physician, please be sure to start the medications on the day directed and adhere closely to the prescription. In addition, you should avoid taking aspirin and aspirin related products for at least 14 days prior to surgery. Aspirin, ibuprofen (Motrin/Advil), naproxen (Aleve), Excedrin (contains aspirin) and related products can thin the blood and create excessive bleeding both during the surgery and in the postoperative period. Tylenol is acceptable and may be taken any time up to the day of surgery.     2) After surgery   At your first postoperative visit any packs that may have been placed will be removed and your nose will be cleaned of clotted blood and debris. This is an uncomfortable procedure, so we recommend that you take a pain pill approximately one hour prior to your appointment, even if you have not been experiencing much postoperative pain up to that day. You will continue to have additional postoperative visits at an interval determined by your surgeon, until your sinuses have completely healed and are doing well. Sinus surgery does NOT eliminate the underlying causes that gave you sinusitis in the first place, so it is important that you continue your medical therapies (nasal spray, irrigation etc) even after surgery.     How do I prepare for surgery?   If your age and/or medical history indicate it, your physician may order testing prior to surgery such as blood tests, EKG, or chest x-ray. If these  have been ordered, please make sure they are completed before the day of surgery.   If you have a significant increase in your sinus infection in the week(s) prior to surgery, notify us. Your surgery may need to be postponed.   Make sure you have a  to take you home after surgery. You will NOT be allowed to leave the hospital by yourself or to drive yourself home.     DO NOT take aspirin or salicylate containing pain medication for at least ten days prior to surgery. Aspirin, even in small quantities, can significantly increase bleeding during surgery and postoperatively.     DO NOT take non-steroidal anti-inflammatory drugs (Ibuprofen, Advil, Motrin, Aleve, Naproxen) for at least five days prior to surgery. These drugs will also increase bleeding, although the effects on the blood are shorter. You may use Tylenol for pain control during this time if needed. Any other medications which thin the blood (such as Coumadin or Plavix) will need to be stopped prior to surgery. Your surgeon will discuss the timing of when to stop coumadin and whether another short acting medication such as Lovenox will be needed to use in place of the Coumadin while you are off it.     DO NOT smoke if at all possible for at least three weeks prior to surgery. Not only does smoking worsen sinus symptoms, smoking in the weeks before or after surgery will result in excessive scarring, and may result in failure of the operation.     DO NOT eat or drink anything beginning at midnight the night before surgery unless otherwise instructed by our colleagues in anesthesia. In most cases your normal morning medications should be taken with a small sip of water on the morning of surgery - your surgeon will let you know if there any exceptions to this.     NOTIFY YOUR SURGEON if you have a prosthesis or heart valve disorder that requires antibiotics at the time of surgery or if you have had prior difficulty with general anesthesia.     What will  happen during surgery?   The surgery is typically not uncomfortable and should not be an unpleasant experience. Depending on the several factors, the operation may be performed under general anesthesia or under local anesthesia with an anesthesiologist providing monitored sedation.     Regardless of anesthetic method chosen, in the preoperative area an intravenous line will be started to administer fluid and you will be given medicines in your IV to help you relax.   Once in the operating room, if local anesthesia is chosen, you will be given additional IV medication to keep you sleepy and relaxed. Medicated pieces of cotton will be placed in your nose and several injections of local anesthetic will be administered inside your nose. The anesthesiologist will make you extra sleepy during this process. Should you experience significant discomfort during the procedure, we will provide additional monitored sedation.     If you are receiving general anesthesia, a temporary tube is inserted into you mouth/throat after you are completely asleep to maintain your breathing. If the planned surgery is for more than 3 hours duration, a urinary catheter may also be placed while you are asleep. It is usually removed before you leave the recovery room. It is rare that a catheter is necessary. When the surgery is completed, the breathing tube is removed from your airway before you regain consciousness. Patients occasionally report sore throat and/or nausea postoperatively as a result of the breathing tube and anesthetic.     What can I expect following my surgery?   Some bloody postnasal discharge may occur for approximately two weeks after this procedure. This is normal and slowly improves. You SHOULD NOT blow your nose for at least seven days following surgery. Since nasal packing is usually not used, you will have some bleeding from your nose and you may go through more than one box of tissues by spotting them with blood during  the first 24-48 hours after surgery. As the sinuses begin to clear themselves, you can expect to have some thick brown drainage from your nose. This is mucus and old blood and does not indicate an infection. You may experience some discomfort postoperatively due to manipulation and inflammation. Take your pain medication as directed. Often extra-strength Tylenol® is sufficient.     On the the first day after surgery you should begin irrigating six times a day with saline using the [facility] Sinus Rinse bottle. If you have had packing placed in your nose, you will not be able to do these things until the packing is removed. Your surgeon will inform you if packing has been placed.   The first follow-up visit is usually arranged at approximately one week after surgery to clean clot and crusts from the nose. This visit is usually uncomfortable and we recommend that you take a dose of your pain medication about one hour prior to the visit. If you are using narcotic pain medicine, you will need a . Consent to the surgery also includes consent to this postoperative care.     Careful postoperative care by both you and your surgeon is essential to the success of the surgery. It is very important that you follow these instructions, as well as any additional instructions given by us, to promote healing and decrease the chance of complications from the surgery.     SOME IMPORTANT POSTOPERATIVE DO´S AND DON´TS AFTER SURGERY     ° DO NOT blow your nose until you have been given permission to do so (usually one week following surgery).     ° DO NOT bend, lift or strain for at lease one week after surgery. These activities will promote bleeding from your nose. You should not plan on participating in any rigorous activity until healing is completed.     ° DO NOT suppress the need to cough or sneeze, but cough/sneeze with your mouth open.     ° DO NOT resume use of any aspirin-containing products or other blood thinners until  after discussing this with us. Typically, you can restart after 7-10 days.     ° DO NOT overuse Tylenol (acetaminophen). Extra-strength Tylenol can be used for pain but can be harmful to your liver if used overdosed. Your prescription narcotic pain medicine also contains Tylenol, so it is important that you don´t use both at the same time. Do not exceed 2000 mg of Tylenol in 24 hours.     ° DO use nasal saline spray (without decongestant) every hour while you are awake beginning the day after surgery. This helps moisten your nose and prevents large crusts from forming.    ° DO use nasal saline irrigation at least six times daily beginning on day after surgery     ° DO take your antibiotics (if they have been prescribed for you). Mild diarrhea from antibiotic usage is common. This can often be prevented by taking acidophilus daily, which is found in yogurt with active cultures or as tablets in a health food store. If you should experience severe persistent diarrhea, this may be indicative of another health problem. In this case stop the antibiotic and notify us. Further evaluation may be required.     ° DO notify us for any of the following: temperature elevations above 100.5 F, clear watery drainage from your nose, changes in vision, swelling of the eyes, worsening headache or neck stiffness.     ° DO use a Qtip to place antibiotic ointment in the very front inside your nostrils as needed to minimize or soften crusting. Neosporin, bacitracin or double/triple antibiotic ointments are all OK to use, up to twice a day     ° DO resume your normal preoperative medications (except aspirin or other blood thinners as noted above)    For the first week following surgery you should not blow your nose or perform any activities requiring exertion such as bending, straining, exercising or lifting anything heavier than 10 pounds. You should not plan any air travel in the first week after surgery.   Ultimately, it will take about  2-3 months for you to fully recover from surgery

## 2024-04-25 PROCEDURE — 88311 DECALCIFY TISSUE: CPT

## 2024-04-25 PROCEDURE — 0753T DGTZ GLS MCRSCP SLD LEVEL IV: CPT

## 2024-04-25 PROCEDURE — 88305 TISSUE EXAM BY PATHOLOGIST: CPT

## 2024-04-29 ENCOUNTER — LAB REQUISITION (OUTPATIENT)
Dept: LAB | Facility: HOSPITAL | Age: 64
End: 2024-04-29

## 2024-04-29 DIAGNOSIS — J32.9 CHRONIC SINUSITIS, UNSPECIFIED: ICD-10-CM

## 2024-05-03 ENCOUNTER — OFFICE VISIT (OUTPATIENT)
Dept: OTOLARYNGOLOGY | Facility: CLINIC | Age: 64
End: 2024-05-03
Payer: MEDICARE

## 2024-05-03 VITALS — HEIGHT: 71 IN | BODY MASS INDEX: 30.8 KG/M2 | WEIGHT: 220 LBS

## 2024-05-03 DIAGNOSIS — J34.89 NASAL DRAINAGE: ICD-10-CM

## 2024-05-03 DIAGNOSIS — J34.3 HYPERTROPHY OF BOTH INFERIOR NASAL TURBINATES: ICD-10-CM

## 2024-05-03 DIAGNOSIS — J33.9 NASAL POLYPS: ICD-10-CM

## 2024-05-03 DIAGNOSIS — J34.89 NASAL OBSTRUCTION: ICD-10-CM

## 2024-05-03 DIAGNOSIS — R09.81 NASAL CONGESTION: ICD-10-CM

## 2024-05-03 DIAGNOSIS — J34.89 NASAL CRUSTING: ICD-10-CM

## 2024-05-03 DIAGNOSIS — R44.8 FACIAL PRESSURE: ICD-10-CM

## 2024-05-03 DIAGNOSIS — J32.9 CHRONIC RHINOSINUSITIS: Primary | ICD-10-CM

## 2024-05-03 PROCEDURE — 1160F RVW MEDS BY RX/DR IN RCRD: CPT | Performed by: STUDENT IN AN ORGANIZED HEALTH CARE EDUCATION/TRAINING PROGRAM

## 2024-05-03 PROCEDURE — 1159F MED LIST DOCD IN RCRD: CPT | Performed by: STUDENT IN AN ORGANIZED HEALTH CARE EDUCATION/TRAINING PROGRAM

## 2024-05-03 PROCEDURE — 1036F TOBACCO NON-USER: CPT | Performed by: STUDENT IN AN ORGANIZED HEALTH CARE EDUCATION/TRAINING PROGRAM

## 2024-05-03 PROCEDURE — 31237 NSL/SINS NDSC SURG BX POLYPC: CPT | Performed by: STUDENT IN AN ORGANIZED HEALTH CARE EDUCATION/TRAINING PROGRAM

## 2024-05-03 PROCEDURE — 99024 POSTOP FOLLOW-UP VISIT: CPT | Performed by: STUDENT IN AN ORGANIZED HEALTH CARE EDUCATION/TRAINING PROGRAM

## 2024-05-03 RX ORDER — DOXYCYCLINE HYCLATE 100 MG
100 TABLET ORAL EVERY 12 HOURS
COMMUNITY
Start: 2024-04-25 | End: 2024-05-05

## 2024-05-03 RX ORDER — HYDROCODONE BITARTRATE AND ACETAMINOPHEN 5; 325 MG/1; MG/1
1 TABLET ORAL EVERY 4 HOURS PRN
COMMUNITY
Start: 2024-04-25

## 2024-05-03 RX ORDER — FINASTERIDE 5 MG/1
5 TABLET, FILM COATED ORAL EVERY 24 HOURS
COMMUNITY

## 2024-05-03 RX ORDER — QUETIAPINE FUMARATE 25 MG/1
25 TABLET, FILM COATED ORAL NIGHTLY
COMMUNITY
Start: 2024-05-02

## 2024-05-03 NOTE — PROGRESS NOTES
HPI  The patient presents for follow up.   Most recent surgery:  1.  Bilateral nasal endoscopy with total ethmoidectomy including       sphenoidotomy with tissue removal  2.  Bilateral nasal endoscopy with frontal sinusotomy  3.  Bilateral maxillary endoscopy with tissue removal   4.  Bilateral submucosal inferior turbinate reductions  5.  Extracranial CT image guidance    Current symptoms: The patient present for follow-up, endorses slight nasal congestion otherwise no sinonasal complaints.    Current treatment:  Antibiotics: Yes  Sinus Rinse: Yes  Steroids: No  Other: None      ROS - No fevers, chills. No cough, hemoptysis. No n/v       Past Medical History:   Diagnosis Date    Acute respiratory distress     Respiratory difficulty    Personal history of other endocrine, nutritional and metabolic disease     H/O diabetes mellitus       Past Surgical History:   Procedure Laterality Date    GASTRIC BYPASS  04/15/2016    Gastric Surgery For Morbid Obesity Bypass With Eamon-en-Y         Current Outpatient Medications on File Prior to Visit   Medication Sig Dispense Refill    albuterol (ProAir HFA) 90 mcg/actuation inhaler Inhale.      busPIRone (Buspar) 15 mg tablet Take 1 tablet (15 mg) by mouth once daily.      cetirizine (ZyrTEC) 10 mg tablet Take 1 tablet (10 mg) by mouth once daily.      cyanocobalamin (Vitamin B-12) 1,000 mcg tablet Take 1 tablet (1,000 mcg) by mouth once daily.      doxycycline (Vibra-Tabs) 100 mg tablet Take 1 tablet (100 mg) by mouth every 12 hours.      escitalopram (Lexapro) 20 mg tablet Take by mouth.      ferrous sulfate 325 (65 Fe) MG EC tablet Take 65 mg by mouth 3 times a day with meals. Do not crush, chew, or split.      finasteride (Proscar) 5 mg tablet Take 1 tablet (5 mg) by mouth once every 24 hours.      fluticasone (Flonase) 50 mcg/actuation nasal spray Administer into affected nostril(s).      multivitamin tablet Take 1 tablet by mouth once daily.      omega-3s-dha-epa-fish oil  (Fish OiL) 120 mg-180 mg- 60 mg-1,200 mg capsule Take by mouth.      QUEtiapine (SEROquel) 25 mg tablet Take 1 tablet (25 mg) by mouth once daily at bedtime.      sod chloride-sodium bicarb (Neilmed Sinus Rinse Complete) nasal rinse Irrigate your nose per instructions twice daily 1 each 3    tamsulosin (Flomax) 0.4 mg 24 hr capsule Take 1 capsule (0.4 mg) by mouth once daily.      zinc gluconate 50 mg tablet Take 1 tablet (50 mg of elemental zinc) by mouth once daily.      HYDROcodone-acetaminophen (Norco) 5-325 mg tablet Take 1 tablet by mouth every 4 hours if needed for moderate pain (4 - 6).      predniSONE (Deltasone) 10 mg tablet TAKE ORALLY 40MG DAILY FOR 5 DAYS THEN 20MG DAILY FOR 4 DAYS THEN 10MG DAILY FOR 2 DAYS THEN 5MG DAILY FOR 2 DAYS. (Patient not taking: Reported on 5/3/2024) 31 tablet 0     No current facility-administered medications on file prior to visit.        Allergies   Allergen Reactions    Lisinopril Other        Review of Systems  A detailed 12 point ROS was performed and is negative except as noted in the intake form, HPI and/or Past Medical History        Physical Exam   CONSTITUTIONAL: Well developed, well nourished.  VOICE: Normal voice quality  RESPIRATION: Breathing comfortably, no stridor.  CV: No clubbing/cyanosis/edema in hands.  EYES: EOM Intact, sclera normal.  NEURO: Alert and oriented times 3, Cranial nerves V,VII intact and symmetric bilaterally.  HEAD AND FACE: Symmetric facial features, no masses or lesions, sinuses nontender to palpation.  SALIVARY GLANDS: Parotid and submandibular glands normal bilaterally.  EARS: Normal external ears, external auditory canals, and TMs to otoscopy  NOSE: External nose midline, anterior rhinoscopy is normal with limited visualization to the anterior aspect of the interior turbinates. No lesions noted.  ORAL CAVITY/OROPHARYNX/LIPS: Normal mucous membranes, normal floor of mouth/tongue/OP, no masses or lesions are noted.  PHARYNGEAL WALLS AND  NASOPHARYNX: No masses noted. Mucosa appears clean and moist  NECK/LYMPH: No LAD, no thyroid masses. Trachea palpably midline  SKIN: Neck skin is without injury  PSYCH: Alert and oriented with appropriate mood and affect     Procedure: bilateral nasal endoscopy with debridement  Pre-op dx: Chronic rhinosinusitis  Post-op dx: same    Procedure in detail:   The risks, benefits, and alternatives were explained.  The patient wished to proceed and provided verbal consent.    After topical anesthetic was given and nasal endoscopy with debridement was performed bilaterally:    Sinus endoscopy:     Right:    Maxillary antrostomy patent  Ethmoid clear to roof  Sphenoid patent  Frontal recess patent     Left:   Maxillary antrostomy patent  Ethmoid clear to roof  Sphenoid patent  Frontal recess patent     Notable findings: Bilateral generalized mucosal edema.  Nasal crusts removed bilaterally from middle meatus and ethmoid regions.  Widely patent nasal corridors.      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   History of septoplasty  History of head and neck cancer, unknown primary s/p chemoradiation therapy 2014.       Plan  Continue saline irrigations  Start saline/mometasone rinses  RTC 2w

## 2024-05-10 LAB
LABORATORY COMMENT REPORT: NORMAL
PATH REPORT.FINAL DX SPEC: NORMAL
PATH REPORT.GROSS SPEC: NORMAL
PATH REPORT.MICROSCOPIC SPEC OTHER STN: NORMAL
PATH REPORT.RELEVANT HX SPEC: NORMAL
PATH REPORT.TOTAL CANCER: NORMAL

## 2024-05-17 ENCOUNTER — OFFICE VISIT (OUTPATIENT)
Dept: OTOLARYNGOLOGY | Facility: CLINIC | Age: 64
End: 2024-05-17
Payer: MEDICARE

## 2024-05-17 VITALS — WEIGHT: 220 LBS | BODY MASS INDEX: 30.8 KG/M2 | HEIGHT: 71 IN

## 2024-05-17 DIAGNOSIS — J34.89 NASAL OBSTRUCTION: ICD-10-CM

## 2024-05-17 DIAGNOSIS — J33.9 NASAL POLYPS: ICD-10-CM

## 2024-05-17 DIAGNOSIS — R09.81 NASAL CONGESTION: ICD-10-CM

## 2024-05-17 DIAGNOSIS — J32.9 CHRONIC RHINOSINUSITIS: Primary | ICD-10-CM

## 2024-05-17 DIAGNOSIS — J34.89 NASAL CRUSTING: ICD-10-CM

## 2024-05-17 DIAGNOSIS — R44.8 FACIAL PRESSURE: ICD-10-CM

## 2024-05-17 DIAGNOSIS — J34.89 NASAL DRAINAGE: ICD-10-CM

## 2024-05-17 DIAGNOSIS — J34.3 HYPERTROPHY OF BOTH INFERIOR NASAL TURBINATES: ICD-10-CM

## 2024-05-17 PROCEDURE — 1036F TOBACCO NON-USER: CPT | Performed by: STUDENT IN AN ORGANIZED HEALTH CARE EDUCATION/TRAINING PROGRAM

## 2024-05-17 PROCEDURE — 1159F MED LIST DOCD IN RCRD: CPT | Performed by: STUDENT IN AN ORGANIZED HEALTH CARE EDUCATION/TRAINING PROGRAM

## 2024-05-17 PROCEDURE — 31237 NSL/SINS NDSC SURG BX POLYPC: CPT | Performed by: STUDENT IN AN ORGANIZED HEALTH CARE EDUCATION/TRAINING PROGRAM

## 2024-05-17 PROCEDURE — 99024 POSTOP FOLLOW-UP VISIT: CPT | Performed by: STUDENT IN AN ORGANIZED HEALTH CARE EDUCATION/TRAINING PROGRAM

## 2024-05-17 PROCEDURE — 1160F RVW MEDS BY RX/DR IN RCRD: CPT | Performed by: STUDENT IN AN ORGANIZED HEALTH CARE EDUCATION/TRAINING PROGRAM

## 2024-05-17 NOTE — PROGRESS NOTES
HPI  The patient presents for follow up.   Most recent surgery:  1.  Bilateral nasal endoscopy with total ethmoidectomy including       sphenoidotomy with tissue removal  2.  Bilateral nasal endoscopy with frontal sinusotomy  3.  Bilateral maxillary endoscopy with tissue removal   4.  Bilateral submucosal inferior turbinate reductions  5.  Extracranial CT image guidance    Current symptoms: States he has been doing well, no sinonasal complaints.  Pathology reviewed and discussed with the patient consistent with benign polypoid sinonasal mucosa with chronic inflammation and edema.  Negative for dysplasia or malignancy..    Current treatment:  Antibiotics: Yes  Sinus Rinse: Yes  Steroids: No  Other: None      ROS - No fevers, chills. No cough, hemoptysis. No n/v       Past Medical History:   Diagnosis Date    Acute respiratory distress     Respiratory difficulty    Personal history of other endocrine, nutritional and metabolic disease     H/O diabetes mellitus       Past Surgical History:   Procedure Laterality Date    GASTRIC BYPASS  04/15/2016    Gastric Surgery For Morbid Obesity Bypass With Eamon-en-Y         Current Outpatient Medications on File Prior to Visit   Medication Sig Dispense Refill    albuterol (ProAir HFA) 90 mcg/actuation inhaler Inhale.      busPIRone (Buspar) 15 mg tablet Take 1 tablet (15 mg) by mouth once daily.      cetirizine (ZyrTEC) 10 mg tablet Take 1 tablet (10 mg) by mouth once daily.      cyanocobalamin (Vitamin B-12) 1,000 mcg tablet Take 1 tablet (1,000 mcg) by mouth once daily.      escitalopram (Lexapro) 20 mg tablet Take by mouth.      ferrous sulfate 325 (65 Fe) MG EC tablet Take 65 mg by mouth 3 times a day with meals. Do not crush, chew, or split.      finasteride (Proscar) 5 mg tablet Take 1 tablet (5 mg) by mouth once every 24 hours.      fluticasone (Flonase) 50 mcg/actuation nasal spray Administer into affected nostril(s).      multivitamin tablet Take 1 tablet by mouth once  daily.      omega-3s-dha-epa-fish oil (Fish OiL) 120 mg-180 mg- 60 mg-1,200 mg capsule Take by mouth.      sod chloride-sodium bicarb (Neilmed Sinus Rinse Complete) nasal rinse Irrigate your nose per instructions twice daily 1 each 3    tamsulosin (Flomax) 0.4 mg 24 hr capsule Take 1 capsule (0.4 mg) by mouth once daily.      zinc gluconate 50 mg tablet Take 1 tablet (50 mg of elemental zinc) by mouth once daily.      HYDROcodone-acetaminophen (Norco) 5-325 mg tablet Take 1 tablet by mouth every 4 hours if needed for moderate pain (4 - 6).      predniSONE (Deltasone) 10 mg tablet TAKE ORALLY 40MG DAILY FOR 5 DAYS THEN 20MG DAILY FOR 4 DAYS THEN 10MG DAILY FOR 2 DAYS THEN 5MG DAILY FOR 2 DAYS. (Patient not taking: Reported on 5/3/2024) 31 tablet 0    QUEtiapine (SEROquel) 25 mg tablet Take 1 tablet (25 mg) by mouth once daily at bedtime.       No current facility-administered medications on file prior to visit.        Allergies   Allergen Reactions    Lisinopril Other        Review of Systems  A detailed 12 point ROS was performed and is negative except as noted in the intake form, HPI and/or Past Medical History        Physical Exam   CONSTITUTIONAL: Well developed, NAD  VOICE: Normal voice quality  RESPIRATION: Breathing comfortably, no stridor.  CV: No clubbing/cyanosis/edema in hands.  EYES: EOM Intact, sclera normal.  NEURO: Alert and oriented times 3, Cranial nerves V,VII intact and symmetric bilaterally.  HEAD AND FACE: Symmetric facial features, no masses or lesions, sinuses nontender to palpation.  SALIVARY GLANDS: Parotid and submandibular glands normal bilaterally.  EARS: Normal external ears, external auditory canals, and TMs to otoscopy  NOSE: External nose midline, anterior rhinoscopy is normal with limited visualization to the anterior aspect of the interior turbinates. No lesions noted.  ORAL CAVITY/OROPHARYNX/LIPS: Normal mucous membranes, normal floor of mouth/tongue/OP, no masses or lesions are  noted.  PHARYNGEAL WALLS AND NASOPHARYNX: No masses noted. Mucosa appears clean and moist  NECK/LYMPH: No LAD, no thyroid masses. Trachea palpably midline  SKIN: Neck skin is without injury  PSYCH: Alert and oriented with appropriate mood and affect     Procedure: bilateral nasal endoscopy with debridement  Pre-op dx: Chronic rhinosinusitis  Post-op dx: same    Procedure in detail:   The risks, benefits, and alternatives were explained.  The patient wished to proceed and provided verbal consent.    After topical anesthetic was given and nasal endoscopy with debridement was performed bilaterally:    Sinus endoscopy:     Right:    Maxillary antrostomy patent  Ethmoid clear to roof  Sphenoid patent  Frontal recess patent     Left:   Maxillary antrostomy patent  Ethmoid clear to roof  Sphenoid patent  Frontal recess patent     Notable findings: Bilateral generalized mucosal edema.  Nasal crusts removed bilaterally from middle meatus and ethmoid regions.  Widely patent nasal corridors.  No mucopurulence.       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   History of septoplasty  History of head and neck cancer, unknown primary s/p chemoradiation therapy 2014.       Plan  Healing well from surgery  Continue saline/mometasone rinses  RTC 3m

## 2024-08-20 ENCOUNTER — APPOINTMENT (OUTPATIENT)
Dept: OTOLARYNGOLOGY | Facility: CLINIC | Age: 64
End: 2024-08-20
Payer: MEDICARE

## 2024-08-20 VITALS — WEIGHT: 220 LBS | HEIGHT: 70 IN | BODY MASS INDEX: 31.5 KG/M2

## 2024-08-20 DIAGNOSIS — J32.9 CHRONIC RHINOSINUSITIS: Primary | ICD-10-CM

## 2024-08-20 DIAGNOSIS — J34.89 NASAL OBSTRUCTION: ICD-10-CM

## 2024-08-20 DIAGNOSIS — J34.3 HYPERTROPHY OF BOTH INFERIOR NASAL TURBINATES: ICD-10-CM

## 2024-08-20 DIAGNOSIS — H60.312 ACUTE DIFFUSE OTITIS EXTERNA OF LEFT EAR: ICD-10-CM

## 2024-08-20 DIAGNOSIS — R09.81 NASAL CONGESTION: ICD-10-CM

## 2024-08-20 DIAGNOSIS — J34.89 NASAL DRAINAGE: ICD-10-CM

## 2024-08-20 DIAGNOSIS — R44.8 FACIAL PRESSURE: ICD-10-CM

## 2024-08-20 DIAGNOSIS — J33.9 NASAL POLYPS: ICD-10-CM

## 2024-08-20 DIAGNOSIS — S01.312A LACERATION OF LEFT EAR CANAL, INITIAL ENCOUNTER: ICD-10-CM

## 2024-08-20 DIAGNOSIS — J33.9 NASAL POLYPOSIS: ICD-10-CM

## 2024-08-20 PROCEDURE — 1159F MED LIST DOCD IN RCRD: CPT | Performed by: STUDENT IN AN ORGANIZED HEALTH CARE EDUCATION/TRAINING PROGRAM

## 2024-08-20 PROCEDURE — 31231 NASAL ENDOSCOPY DX: CPT | Performed by: STUDENT IN AN ORGANIZED HEALTH CARE EDUCATION/TRAINING PROGRAM

## 2024-08-20 PROCEDURE — 3008F BODY MASS INDEX DOCD: CPT | Performed by: STUDENT IN AN ORGANIZED HEALTH CARE EDUCATION/TRAINING PROGRAM

## 2024-08-20 PROCEDURE — 1036F TOBACCO NON-USER: CPT | Performed by: STUDENT IN AN ORGANIZED HEALTH CARE EDUCATION/TRAINING PROGRAM

## 2024-08-20 PROCEDURE — 99214 OFFICE O/P EST MOD 30 MIN: CPT | Performed by: STUDENT IN AN ORGANIZED HEALTH CARE EDUCATION/TRAINING PROGRAM

## 2024-08-20 RX ORDER — AMOXICILLIN AND CLAVULANATE POTASSIUM 875; 125 MG/1; MG/1
875 TABLET, FILM COATED ORAL 2 TIMES DAILY
Qty: 20 TABLET | Refills: 0 | Status: SHIPPED | OUTPATIENT
Start: 2024-08-20 | End: 2024-08-30

## 2024-08-20 RX ORDER — FLUTICASONE PROPIONATE 93 UG/1
SPRAY, METERED NASAL
Qty: 48 ML | Refills: 3 | Status: SHIPPED | OUTPATIENT
Start: 2024-08-20

## 2024-08-20 RX ORDER — OFLOXACIN 3 MG/ML
5 SOLUTION AURICULAR (OTIC) 2 TIMES DAILY
Qty: 0.5 ML | Refills: 0 | Status: SHIPPED | OUTPATIENT
Start: 2024-08-20 | End: 2024-08-30

## 2024-08-20 RX ORDER — PREDNISONE 10 MG/1
TABLET ORAL
Qty: 24 TABLET | Refills: 0 | Status: SHIPPED | OUTPATIENT
Start: 2024-08-20

## 2024-08-20 NOTE — PROGRESS NOTES
HPI  The patient presents for follow up.   Most recent surgery:  1.  Bilateral nasal endoscopy with total ethmoidectomy including       sphenoidotomy with tissue removal  2.  Bilateral nasal endoscopy with frontal sinusotomy  3.  Bilateral maxillary endoscopy with tissue removal   4.  Bilateral submucosal inferior turbinate reductions  5.  Extracranial CT image guidance    Current symptoms: Endorses bilateral aural fullness and ear discomfort more noticeable in the left ear.  States he instrumented his ears with Q-tips.  Endorses occasional bilateral mucoid nasal drainage, stopped saline/mometasone rinses for several months.       Current treatment:  Antibiotics: No  Sinus Rinse: No  Steroids: No  Other: None      ROS - No fevers, chills. No cough, hemoptysis. No n/v       Past Medical History:   Diagnosis Date    Acute respiratory distress     Respiratory difficulty    Personal history of other endocrine, nutritional and metabolic disease     H/O diabetes mellitus       Past Surgical History:   Procedure Laterality Date    GASTRIC BYPASS  04/15/2016    Gastric Surgery For Morbid Obesity Bypass With [id number]         Current Outpatient Medications on File Prior to Visit   Medication Sig Dispense Refill    albuterol (ProAir HFA) 90 mcg/actuation inhaler Inhale.      busPIRone (Buspar) 15 mg tablet Take 1 tablet (15 mg) by mouth once daily.      cetirizine (ZyrTEC) 10 mg tablet Take 1 tablet (10 mg) by mouth once daily.      cyanocobalamin (Vitamin B-12) 1,000 mcg tablet Take 1 tablet (1,000 mcg) by mouth once daily.      escitalopram (Lexapro) 20 mg tablet Take by mouth.      ferrous sulfate 325 (65 Fe) MG EC tablet Take 65 mg by mouth 3 times a day with meals. Do not crush, chew, or split.      finasteride (Proscar) 5 mg tablet Take 1 tablet (5 mg) by mouth once every 24 hours.      fluticasone (Flonase) 50 mcg/actuation nasal spray Administer into affected nostril(s).      HYDROcodone-acetaminophen (Norco) 5-325 mg  tablet Take 1 tablet by mouth every 4 hours if needed for moderate pain (4 - 6).      multivitamin tablet Take 1 tablet by mouth once daily.      omega-3s-dha-epa-fish oil (Fish OiL) 120 mg-180 mg- 60 mg-1,200 mg capsule Take by mouth.      predniSONE (Deltasone) 10 mg tablet TAKE ORALLY 40MG DAILY FOR 5 DAYS THEN 20MG DAILY FOR 4 DAYS THEN 10MG DAILY FOR 2 DAYS THEN 5MG DAILY FOR 2 DAYS. 31 tablet 0    QUEtiapine (SEROquel) 25 mg tablet Take 1 tablet (25 mg) by mouth once daily at bedtime.      sod chloride-sodium bicarb (Neilmed Sinus Rinse Complete) nasal rinse Irrigate your nose per instructions twice daily 1 each 3    tamsulosin (Flomax) 0.4 mg 24 hr capsule Take 1 capsule (0.4 mg) by mouth once daily.      zinc gluconate 50 mg tablet Take 1 tablet (50 mg of elemental zinc) by mouth once daily.       No current facility-administered medications on file prior to visit.        Allergies   Allergen Reactions    Lisinopril Other        Review of Systems  A detailed 12 point ROS was performed and is negative except as noted in the intake form, HPI and/or Past Medical History        Physical Exam   CONSTITUTIONAL: Well developed, NAD  VOICE: Normal voice quality  RESPIRATION: Breathing comfortably, no stridor.  CV: No clubbing/cyanosis/edema in hands.  EYES: EOM Intact, sclera normal.  NEURO: Alert and oriented times 3, Cranial nerves V,VII intact and symmetric bilaterally.  HEAD AND FACE: Symmetric facial features, no masses or lesions, sinuses nontender to palpation.  SALIVARY GLANDS: Parotid and submandibular glands normal bilaterally.  + EARS: Normal external ears  Right EAC patent, tympanic membrane intact  Left EAC edematous with laceration and dried blood along the anterior canal.  Tympanic membrane appears intact.  NOSE: External nose midline, anterior rhinoscopy is normal with limited visualization to the anterior aspect of the interior turbinates. No lesions noted.  ORAL CAVITY/OROPHARYNX/LIPS: Normal  mucous membranes, normal floor of mouth/tongue/OP, no masses or lesions are noted.  PHARYNGEAL WALLS AND NASOPHARYNX: No masses noted. Mucosa appears clean and moist  NECK/LYMPH: No LAD, no thyroid masses. Trachea palpably midline  SKIN: Neck skin is without injury  PSYCH: Alert and oriented with appropriate mood and affect     Nasal endoscopy:  PROCEDURE NOTE    For better visualization because of septal deviation, turbinate hypertrophy nasal endoscopy was performed after verbal consent was obtained by the patient and/or guardian. Both nostrils were sprayed with a mixture of lidocaine 4% and Afrin. After a sufficient amount of time elapsed for mucosal anesthesia to take place, the nasal endoscope was advanced into the nostril.    The following areas were visualized:  Nasal passage, nasal septum, turbinates, middle meatus, nasopharynx, sinus ostia    The patient tolerated the procedure well and these structures were found to be normal except as follows:  Right:    Maxillary antrostomy patent  Ethmoid clear to roof  Sphenoid patent  Frontal recess patent     Left:   Maxillary antrostomy patent  Ethmoid clear to roof  Sphenoid patent  Frontal recess patent     Notable findings: Bilateral generalized mucosal edema.   Polypoid edema noted along the ethmoid regions bilaterally.  Small amount of mucopurulent drainage noted along the left posterior ethmoid region. Widely patent nasal corridors.        Assessment  Chronic rhinosinusitis with nasal polyps s/p ESS 4/25/24  Nasal airway obstruction, bilateral inferior turbinate hypertrophy s/p bilateral inferior turbinate reductions.   History of septoplasty  History of head and neck cancer, unknown primary s/p chemoradiation therapy 2014.       Plan  - CRS w/ NP  Nasal endoscopy notable for generalized mucosal edema and mucopurulent drainage along the left ethmoid region.  Augmentin and prednisone course prescribed (risks/potential adverse effects discussed)  Reports he would  like to avoid saline/mometasone irrigations and proceed with Xhance.    - Left otitis externa, EAC trauma  Exam notable for Left EAC laceration, dried blood and edema.  Floxin course prescribed.  The patient was advised to avoid further ear canal instrumentation.    RTC 2m

## 2024-10-29 ENCOUNTER — APPOINTMENT (OUTPATIENT)
Dept: OTOLARYNGOLOGY | Facility: CLINIC | Age: 64
End: 2024-10-29
Payer: MEDICARE